# Patient Record
Sex: FEMALE | Race: WHITE | Employment: FULL TIME | URBAN - METROPOLITAN AREA
[De-identification: names, ages, dates, MRNs, and addresses within clinical notes are randomized per-mention and may not be internally consistent; named-entity substitution may affect disease eponyms.]

---

## 2017-01-05 ENCOUNTER — OP HISTORICAL/CONVERTED ENCOUNTER (OUTPATIENT)
Dept: OTHER | Age: 62
End: 2017-01-05

## 2017-01-10 ENCOUNTER — IP HISTORICAL/CONVERTED ENCOUNTER (OUTPATIENT)
Dept: OTHER | Age: 62
End: 2017-01-10

## 2017-07-21 ENCOUNTER — OP HISTORICAL/CONVERTED ENCOUNTER (OUTPATIENT)
Dept: OTHER | Age: 62
End: 2017-07-21

## 2017-11-16 ENCOUNTER — OP HISTORICAL/CONVERTED ENCOUNTER (OUTPATIENT)
Dept: OTHER | Age: 62
End: 2017-11-16

## 2017-12-19 ENCOUNTER — OP HISTORICAL/CONVERTED ENCOUNTER (OUTPATIENT)
Dept: OTHER | Age: 62
End: 2017-12-19

## 2018-01-02 ENCOUNTER — IP HISTORICAL/CONVERTED ENCOUNTER (OUTPATIENT)
Dept: OTHER | Age: 63
End: 2018-01-02

## 2018-01-09 ENCOUNTER — OP HISTORICAL/CONVERTED ENCOUNTER (OUTPATIENT)
Dept: OTHER | Age: 63
End: 2018-01-09

## 2018-04-18 ENCOUNTER — OP HISTORICAL/CONVERTED ENCOUNTER (OUTPATIENT)
Dept: OTHER | Age: 63
End: 2018-04-18

## 2018-04-18 ENCOUNTER — ED HISTORICAL/CONVERTED ENCOUNTER (OUTPATIENT)
Dept: OTHER | Age: 63
End: 2018-04-18

## 2019-05-08 ENCOUNTER — ED HISTORICAL/CONVERTED ENCOUNTER (OUTPATIENT)
Dept: OTHER | Age: 64
End: 2019-05-08

## 2020-02-19 ENCOUNTER — OP HISTORICAL/CONVERTED ENCOUNTER (OUTPATIENT)
Dept: OTHER | Age: 65
End: 2020-02-19

## 2020-11-27 ENCOUNTER — TRANSCRIBE ORDER (OUTPATIENT)
Dept: EMERGENCY DEPT | Age: 65
End: 2020-11-27

## 2020-11-27 ENCOUNTER — HOSPITAL ENCOUNTER (OUTPATIENT)
Dept: LAB | Age: 65
Discharge: HOME | End: 2020-11-27
Payer: OTHER GOVERNMENT

## 2020-11-27 DIAGNOSIS — Z01.812 PRE-PROCEDURAL LABORATORY EXAMINATIONS: ICD-10-CM

## 2020-11-27 DIAGNOSIS — Z01.812 PRE-PROCEDURAL LABORATORY EXAMINATIONS: Primary | ICD-10-CM

## 2020-11-27 PROCEDURE — 87635 SARS-COV-2 COVID-19 AMP PRB: CPT

## 2020-11-28 LAB — SARS-COV-2, COV2NT: NOT DETECTED

## 2020-11-30 RX ORDER — LANOLIN ALCOHOL/MO/W.PET/CERES
325 CREAM (GRAM) TOPICAL
COMMUNITY

## 2020-11-30 RX ORDER — BISMUTH SUBSALICYLATE 262 MG
1 TABLET,CHEWABLE ORAL DAILY
COMMUNITY

## 2020-12-01 ENCOUNTER — ANESTHESIA (OUTPATIENT)
Dept: ENDOSCOPY | Age: 65
End: 2020-12-01
Payer: OTHER GOVERNMENT

## 2020-12-01 ENCOUNTER — ANESTHESIA EVENT (OUTPATIENT)
Dept: ENDOSCOPY | Age: 65
End: 2020-12-01
Payer: OTHER GOVERNMENT

## 2020-12-01 ENCOUNTER — HOSPITAL ENCOUNTER (OUTPATIENT)
Age: 65
Setting detail: OUTPATIENT SURGERY
Discharge: HOME | End: 2020-12-01
Attending: SPECIALIST | Admitting: SPECIALIST
Payer: OTHER GOVERNMENT

## 2020-12-01 VITALS
SYSTOLIC BLOOD PRESSURE: 137 MMHG | TEMPERATURE: 97.7 F | RESPIRATION RATE: 15 BRPM | DIASTOLIC BLOOD PRESSURE: 56 MMHG | WEIGHT: 178.35 LBS | HEART RATE: 57 BPM | BODY MASS INDEX: 28.66 KG/M2 | OXYGEN SATURATION: 99 % | HEIGHT: 66 IN

## 2020-12-01 PROCEDURE — 74011250636 HC RX REV CODE- 250/636: Performed by: NURSE ANESTHETIST, CERTIFIED REGISTERED

## 2020-12-01 PROCEDURE — 76040000019: Performed by: SPECIALIST

## 2020-12-01 PROCEDURE — 76060000031 HC ANESTHESIA FIRST 0.5 HR: Performed by: SPECIALIST

## 2020-12-01 PROCEDURE — 74011250637 HC RX REV CODE- 250/637: Performed by: SPECIALIST

## 2020-12-01 PROCEDURE — 2709999900 HC NON-CHARGEABLE SUPPLY: Performed by: SPECIALIST

## 2020-12-01 PROCEDURE — 74011250636 HC RX REV CODE- 250/636: Performed by: SPECIALIST

## 2020-12-01 PROCEDURE — 74011000250 HC RX REV CODE- 250: Performed by: NURSE ANESTHETIST, CERTIFIED REGISTERED

## 2020-12-01 RX ORDER — FENTANYL CITRATE 50 UG/ML
25 INJECTION, SOLUTION INTRAMUSCULAR; INTRAVENOUS AS NEEDED
Status: DISCONTINUED | OUTPATIENT
Start: 2020-12-01 | End: 2020-12-01 | Stop reason: HOSPADM

## 2020-12-01 RX ORDER — LIDOCAINE HYDROCHLORIDE 20 MG/ML
INJECTION, SOLUTION EPIDURAL; INFILTRATION; INTRACAUDAL; PERINEURAL AS NEEDED
Status: DISCONTINUED | OUTPATIENT
Start: 2020-12-01 | End: 2020-12-01 | Stop reason: HOSPADM

## 2020-12-01 RX ORDER — SODIUM CHLORIDE 9 MG/ML
50 INJECTION, SOLUTION INTRAVENOUS CONTINUOUS
Status: DISCONTINUED | OUTPATIENT
Start: 2020-12-01 | End: 2020-12-01 | Stop reason: HOSPADM

## 2020-12-01 RX ORDER — NALOXONE HYDROCHLORIDE 0.4 MG/ML
0.4 INJECTION, SOLUTION INTRAMUSCULAR; INTRAVENOUS; SUBCUTANEOUS
Status: DISCONTINUED | OUTPATIENT
Start: 2020-12-01 | End: 2020-12-01 | Stop reason: HOSPADM

## 2020-12-01 RX ORDER — DEXTROMETHORPHAN/PSEUDOEPHED 2.5-7.5/.8
1.2 DROPS ORAL
Status: DISCONTINUED | OUTPATIENT
Start: 2020-12-01 | End: 2020-12-01 | Stop reason: HOSPADM

## 2020-12-01 RX ORDER — PROPOFOL 10 MG/ML
INJECTION, EMULSION INTRAVENOUS
Status: DISCONTINUED | OUTPATIENT
Start: 2020-12-01 | End: 2020-12-01 | Stop reason: HOSPADM

## 2020-12-01 RX ORDER — MIDAZOLAM HYDROCHLORIDE 1 MG/ML
.25-5 INJECTION, SOLUTION INTRAMUSCULAR; INTRAVENOUS AS NEEDED
Status: DISCONTINUED | OUTPATIENT
Start: 2020-12-01 | End: 2020-12-01 | Stop reason: HOSPADM

## 2020-12-01 RX ORDER — PROPOFOL 10 MG/ML
INJECTION, EMULSION INTRAVENOUS AS NEEDED
Status: DISCONTINUED | OUTPATIENT
Start: 2020-12-01 | End: 2020-12-01 | Stop reason: HOSPADM

## 2020-12-01 RX ORDER — FLUMAZENIL 0.1 MG/ML
0.2 INJECTION INTRAVENOUS
Status: DISCONTINUED | OUTPATIENT
Start: 2020-12-01 | End: 2020-12-01 | Stop reason: HOSPADM

## 2020-12-01 RX ADMIN — SODIUM CHLORIDE 50 ML/HR: 900 INJECTION, SOLUTION INTRAVENOUS at 07:19

## 2020-12-01 RX ADMIN — PROPOFOL 80 MG: 10 INJECTION, EMULSION INTRAVENOUS at 08:10

## 2020-12-01 RX ADMIN — LIDOCAINE HYDROCHLORIDE 20 MG: 20 INJECTION, SOLUTION INTRAVENOUS at 08:15

## 2020-12-01 RX ADMIN — LIDOCAINE HYDROCHLORIDE 40 MG: 20 INJECTION, SOLUTION INTRAVENOUS at 08:10

## 2020-12-01 RX ADMIN — PROPOFOL 140 MCG/KG/MIN: 10 INJECTION, EMULSION INTRAVENOUS at 08:10

## 2020-12-01 RX ADMIN — LIDOCAINE HYDROCHLORIDE 20 MG: 20 INJECTION, SOLUTION INTRAVENOUS at 08:13

## 2020-12-01 NOTE — PROCEDURES
[facility] GURU Mccann MD  [phone]      2020    Esophagogastroduodenoscopy & Colonoscopy Procedure Note  Beryl Webber  : 1955  [address] [place] [address] Medical Record Number: [id number]      Indications:    Epigastric pain. Screening Colonoscopy   Referring Physician:  Emerson Calle MD  Anesthesia/Sedation: Conscious Sedation/Moderate Sedation/MAC  Endoscopist:  Dr. Rodrigue Peraza  Complications:  None  Estimated Blood Loss:  None    Permit:  The indications, risks, benefits and alternatives were reviewed with the patient or their decision maker who was provided an opportunity to ask questions and all questions were answered. The specific risks of esophagogastroduodenoscopy with conscious sedation were reviewed, including but not limited to anesthetic complication, bleeding, adverse drug reaction, missed lesion, infection, IV site reactions, and intestinal perforation which would lead to the need for surgical repair. Alternatives to EGD and colonoscopy including radiographic imaging, observation without testing, or laboratory testing were reviewed as well as the limitations of those alternatives discussed. After considering the options and having all their questions answered, the patient or their decision maker provided both verbal and written consent to proceed. -----------EGD------------   Procedure in Detail:  After obtaining informed consent, positioning of the patient in the left lateral decubitus position, and conduction of a pre-procedure pause or \"time out\" the endoscope was introduced into the mouth and advanced to the duodenum. A careful inspection was made, and findings or interventions are described below. Findings:   Esophagus:normal  Stomach: Status post fernanda-Y gastric bypass with healthy stomach and anastomosis.   No stenosis stricture or ulceration at the anastomosis. Duodenum/jejunum: normal        ----------Colonoscopy-----------    Procedure in Detail:  After obtaining informed consent, positioning of the patient in the left lateral decubitus position, and conduction of a pre-procedure pause or \"time out\" the endoscope was introduced into the anus and advanced to the cecum, which was identified by the ileocecal valve and appendiceal orifice. The quality of the colonic preparation was good. A careful inspection was made as the colonoscope was withdrawn, findings and interventions are described below. Findings:   No polyps masses or cancer. Somewhat difficult procedure required pressure from the assistant but definitive intubation of the cecum and photodocumentation obtained. ------------------------------  Specimens:    none    Complications:   None; patient tolerated the procedure well. Impressions:  EGD:  Normal for her surgical history. Colonoscopy: Normal colonoscopy to the cecum, with no evidence of neoplasia, diverticular disease, or mucosal abnormality. Recommendations:     - For colon cancer screening in this average-risk patient, colonoscopy may be repeated in 10 years. - Will request imaging if her epigastric pain symptoms and regurgitation continue. Thank you for entrusting me with this patient's care. Please do not hesitate to contact me with any questions or if I can be of assistance with any of your other patients' GI needs. Signed By: Maya Og MD                        December 1, 2020    Surgical assistant none. Implants none unless specified.

## 2020-12-01 NOTE — INTERVAL H&P NOTE
Pre-Endoscopy H&P Update  Chief complaint/HPI/ROS:  The indication for the procedure, the patient's history and the patient's current medications are reviewed prior to the procedure and that data is reported on the H&P to which this document is attached. Any significant complaints with regard to organ systems will be noted, and if not mentioned then a review of systems is not contributory. Past Medical History:   Diagnosis Date    Asthma     not treated since     Herpes simplex     Kidney stones     Seasonal allergic rhinitis     Thromboembolus ([address]     right leg/lung      Past Surgical History:   Procedure Laterality Date    BREAST SURGERY PROCEDURE UNLISTED      right breast bx - benign    HX GASTRIC BYPASS  2009    HX GYN       x 1 with tubal ligation    HX ORTHOPAEDIC Left     partial and full knee    HX ORTHOPAEDIC Right     partial knee    HX OTHER SURGICAL      Blade filter right groin    HX OTHER SURGICAL      colon x 1 - benign colon polys x 3/EGD x 4-5 with dilation    HX OTHER SURGICAL  0229,0370    tumor removed left jaw x 2 - benign/with bone removed from hip to reconstruct jaw    HX OTHER SURGICAL      steel plate in jaw removed     Social   Social History     Tobacco Use    Smoking status: Never Smoker    Smokeless tobacco: Never Used   Substance Use Topics    Alcohol use: No      Family History   Problem Relation Age of Onset    Kidney Disease Mother     Cancer Father 71        lymphoma    Cancer Paternal Grandfather         prostate      No Known Allergies   Prior to Admission Medications   Prescriptions Last Dose Informant Patient Reported? Taking? VALACYCLOVIR HCL (VALTREX PO) 2020  Yes No   Sig: Take 1 Tab by mouth daily as needed. cetirizine (ZYRTEC) 10 mg tablet 2020  Yes No   Sig: Take 10 mg by mouth daily. ferrous sulfate (Iron) 325 mg (65 mg iron) tablet 2020  Yes Yes   Sig: Take 325 mg by mouth Daily (before breakfast). fluticasone propionate (FLONASE ALLERGY RELIEF NA) 2020 at Unknown time  Yes Yes   Si Spray by Nasal route as needed. multivitamin (ONE A DAY) tablet 2020  Yes Yes   Sig: Take 1 Tab by mouth daily. Facility-Administered Medications: None       PHYSICAL EXAM:  The patient is examined immediately prior to the procedure. Visit Vitals  /64   Pulse (!) 53   Temp 98.9 °F (37.2 °C)   Resp 13   Ht 5' 6\" (1.676 m)   Wt 80.9 kg (178 lb 5.6 oz)   SpO2 99%   Breastfeeding No   BMI 28.79 kg/m²     Gen: Appears comfortable, no distress. Pulm: comfortable respirations with no abnormal audible breath sounds  HEART: well perfused, no abnormal audible heart sounds  GI: abdomen flat. PLAN:  Informed consent discussion held, patient afforded an opportunity to ask questions and all questions answered. After being advised of the risks, benefits, and alternatives, the patient requested that we proceed and indicated so on a written consent form. Will proceed with procedure as planned.   Saige Casas MD

## 2020-12-01 NOTE — PERIOP NOTES
Received report from Sonia Holder CRNA. See anesthesia record. Patient taken to post-recovery. Post-recovery report given to Luzmaria Cortez RN. Patient's ABD remains soft and non-tender post procedure. Pt has no complaints at this time and tolerated the procedure well. Endoscope was pre-cleaned at bedside immediately following procedure by Ever Mukherjee

## 2020-12-01 NOTE — ANESTHESIA PREPROCEDURE EVALUATION
Relevant Problems   No relevant active problems       Anesthetic History   No history of anesthetic complications            Review of Systems / Medical History  Patient summary reviewed and pertinent labs reviewed    Pulmonary  Within defined limits                 Neuro/Psych   Within defined limits           Cardiovascular                  Exercise tolerance: >4 METS     GI/Hepatic/Renal         Renal disease: stones       Endo/Other        Arthritis and anemia     Other Findings   Comments: DVT/PE-el filter in place    Benign tumor surgically removed from jaw           Physical Exam    Airway  Mallampati: I  TM Distance: 4 - 6 cm  Neck ROM: normal range of motion   Mouth opening: Normal     Cardiovascular    Rhythm: regular  Rate: normal         Dental    Dentition: Upper dentition intact and Lower dentition intact     Pulmonary  Breath sounds clear to auscultation               Abdominal         Other Findings            Anesthetic Plan    ASA: 2  Anesthesia type: MAC          Induction: Intravenous  Anesthetic plan and risks discussed with: Patient

## 2020-12-01 NOTE — ROUTINE PROCESS
Beryl Big  1955  [id number]    Situation:  Verbal report received from: Sheridan Alvares  Procedure: Procedure(s):  COLONOSCOPY/EGD  ESOPHAGOGASTRODUODENOSCOPY (EGD)    Background:    Preoperative diagnosis: EPIGASTRIC PAIN/HX COLON POLYPS  Postoperative diagnosis: 1-History of Gastric Bypass  2- Normal Colonoscopy. :  Dr. Doug Mccann  Assistant(s): Endoscopy Technician-1: Rios Daley  Endoscopy RN-1: Edna Go    Specimens: * No specimens in log *  H. Pylori  no    Assessment:  Intra-procedure medications     Anesthesia gave intra-procedure sedation and medications, see anesthesia flow sheet yes    Intravenous fluids: NS@ KVO     Vital signs stable     Abdominal assessment: round and soft     Recommendation:  Discharge patient per MD order. Family-  Permission to share finding with family or friend yes    Endoscopy discharge instructions have been reviewed and given to patient. The patient verbalized understanding and acceptance of instructions. Dr. Doug Mccann discussed with spouse procedure findings and next steps.

## 2020-12-01 NOTE — ANESTHESIA POSTPROCEDURE EVALUATION
Procedure(s):  COLONOSCOPY/EGD  ESOPHAGOGASTRODUODENOSCOPY (EGD). MAC    Anesthesia Post Evaluation      Multimodal analgesia: multimodal analgesia used between 6 hours prior to anesthesia start to PACU discharge  Patient location during evaluation: bedside  Patient participation: complete - patient participated  Level of consciousness: awake  Pain management: adequate  Airway patency: patent  Anesthetic complications: no  Cardiovascular status: acceptable  Respiratory status: acceptable  Hydration status: acceptable        INITIAL Post-op Vital signs:   Vitals Value Taken Time   /56 12/1/2020  8:52 AM   Temp 36.5 °C (97.7 °F) 12/1/2020  8:40 AM   Pulse 60 12/1/2020  8:54 AM   Resp 15 12/1/2020  8:54 AM   SpO2 98 % 12/1/2020  8:55 AM   Vitals shown include unvalidated device data.

## 2020-12-01 NOTE — DISCHARGE INSTRUCTIONS
[address] [facility] GURU Lancaster MD  [phone]      December 1, 2020    Alexis Gaffney  YOB: 1955    COLONOSCOPY DISCHARGE INSTRUCTIONS    If there is redness at IV site you should apply warm compress to area. If redness or soreness persist contact Dr. Fatoumata Lancaster' or your primary care doctor. There may be a slight amount of blood passed from the rectum. Gaseous discomfort may develop, but walking, belching will help relieve this. You may not operate a vehicle for 12 hours  You may not operate machinery or dangerous appliances for rest of today  You may not drink alcoholic beverages for 12 hours  Avoid making any critical decisions for 24 hours    DIET:  You may resume your normal diet, but some patients find that heavy or large meals may lead to indigestion or vomiting. I suggest a light meal as first food intake. MEDICATIONS:  The use of some over-the-counter pain medication may lead to bleeding after colon biopsies or polyp removal.  Tylenol (also called acetaminophen) is safe to take even if you have had colonoscopy with polyp removal.  Based on the procedure you had today you may safely take aspirin or aspirin-like products for the next ten (10) days. Remember that Tylenol (also called acetaminophen) is safe to take after colonoscopy even if you have had biopsies or polyps removed. ACTIVITY:  You may resume your normal household activities, but it is recommended that you spend the remainder of the day resting -  avoid any strenuous activity. CALL DR. Shahrzad Haney' OFFICE IF:  Increasing pain, nausea, vomiting  Abdominal distension (swelling)  Significant new or increased bleeding (oral or rectal)  Fever/Chills  Chest pain/shortness of breath                       Additional instructions: The colon is normal - no polyps, no colon cancer. We were able to examine the entire colon today.   The upper GI tract reveals that you've had previous gastric bypass surgery but no other findings - no inflammation, no ulcer, no restriction or narrowing. I've left word with my office that if you have ongoing symptoms of upper abdominal pain or vomiting then the next step would be you to call the office and we'll arrange CT scan. For your alteration in bowel habits I suggest you try a probiotic called ALIGN, available over the counter without prescription - take one capsule daily for 8 weeks and I'll arrange a follow up in the office in 8 weeks (it may take that long for the probiotic to reach its maximum effect). It was an honor to be your doctor today. Please let me or my office staff know if you have any feedback about today's procedure. Maya Og MD    Colonoscopy saves lives, and can prevent colon cancer. Everyone aged 48 or older needs colonoscopy.   Tell your family and friends: get the test!

## 2020-12-01 NOTE — H&P
Date: 10/2/2020 1:00 PM   Patient Name: Erika Kimball   Account #: [id number]    Gender: Female    (age): 1955 (60)       Provider:     Argelia Raygoza PA-C        Referring Physician:     [address], [address]  [phone] (phone)  [phone] (fax)        Chief Complaint: epigastric pain, due for colonoscopy. History of Present Illness:   59 y/o female who presents to the office today for evaluation for epigastric pain/chest pain. She reports she had a h/o gastric bypass in . She reports the pain was constant, radiated to her back. It went away for a couple of days, but then returned. She describes it as an ache. She reports that when it started, everything she ate came back up. It feels like it goes down, then comes back up. She is not vomiting it back up within 10 minutes (more regurgitation) She reports that now, most of the time things stay down, but it still is intermittently problematic. She denies worsening any time of day, but tells me the pain is worst post-prandially. No hematemesis/coffee-ground emesis. She denies any weight loss and actually endorses weight gain. The pain was constant, not with exertion, no shortness of breath/dyspnea on exertion, no palpitations, no diaphoresis, no jaw pain or arm pain. Her last colonoscopy was in  where they removed polyps (unknown # of polyps or histology), and prior to that she had polyps. (first colonoscopy was in , recall 3 years, second  with recall 10 years.)    She alternates between constipation/diarrhea, but she did see some red color in her stool once a few weeks ago. No family history of colon cancer. No GERD history. No history of hiatal hernia. No NSAIDs at all, but took a few aspirin. No shortness of breath, no dyspnea on exertion. She is very fatigued all the time. She also has lower extremity weakness. ? 59 y/o female who presents to the office today for evaluation for epigastric pain/chest pain. She reports she had a h/o gastric bypass in 2009. She reports the pain was constant, radiated to her back. It went away for a couple of days, but then returned. She describes it as an ache. She reports that when it started, everything she ate came back up. It feels like it goes down, then comes back up. She is not vomiting it back up within 10 minutes (more regurgitation) She reports that now, most of the time things stay down, but it still is intermittently problematic. She denies worsening any time of day, but tells me the pain is worst post-prandially. No hematemesis/coffee-ground emesis. She denies any weight loss and actually endorses weight gain. The pain was constant, not with exertion, no shortness of breath/dyspnea on exertion, no palpitations, no diaphoresis, no jaw pain or arm pain. Her last colonoscopy was in 2009 where they removed polyps (unknown # of polyps or histology), and prior to that she had polyps. (first colonoscopy was in 2006, recall 3 years, second 2009 with recall 10 years.)    She alternates between constipation/diarrhea, but she did see some red color in her stool once a few weeks ago. No family history of colon cancer. No GERD history. No history of hiatal hernia. No NSAIDs at all, but took a few aspirin. No shortness of breath, no dyspnea on exertion. ? She is very fatigued all the time. She also has lower extremity weakness.       Past Medical History      Medical Conditions: Anemia  Arthritis  Asthma  Colon polyps  Hemorrhoids  MRSA   Surgical Procedures: Gastric By-Pass   Dx Studies: Abdominal U/S  Colonoscopy  CT Scan   Medications: cetirizine 10 mg  ergocalciferol (vitamin D2) 1,250 mcg (50,000 unit)  ferrous sulfate 325 mg (65 mg iron)  fluticasone propionate 50 mcg/actuation  melatonin 3 mg  valacyclovir 500 mg   Allergies: Patient has no known allergies or drug allergies   Immunizations: Influenza, seasonal, injectable, 10/01/2019  Influenza, seasonal, injectable      Social History      Alcohol: None   Tobacco: Never smoker   Drugs: None   Exercise: Exercise less than 3 times a week. Caffeine: Daily. Marital Status:          Occupation:               Family History       Review of Systems:   Cardiovascular: Presents suffers from chest pain, peripheral edema. Denies irregular heart beat, palpitations, syncope, Sweats. Constitutional: Presents suffers from fatigue, weight gain. Denies fever, loss of appetite, weight loss. ENMT: Denies nose bleeds, sore throat, hearing loss. Endocrine: Denies excessive thirst, heat intolerance. Eyes: Denies loss of vision. Gastrointestinal: Presents suffers from abdominal pain, change in bowel habits, constipation, diarrhea, Bloating/gas, nausea, vomiting. Denies abdominal swelling, heartburn, jaundice, rectal bleeding, stomach cramps, dysphagia, rectal pain, Stool incontinence, hematemesis. Genitourinary: Denies dark urine, dysuria, frequent urination, hematuria, incontinence. Hematologic/Lymphatic: Denies easy bruising, prolonged bleeding. Integumentary: Denies itching, rashes, sun sensitivity. Musculoskeletal: Presents suffers from arthritis, back pain, joint pain, muscle weakness. Denies gout, stiffness. Neurological: Presents suffers from dizziness. Denies fainting, frequent headaches, memory loss. Psychiatric: Denies anxiety, depression, difficulty sleeping, hallucinations, nervousness, panic attacks, paranoia. Respiratory: Presents suffers from dyspnea. Denies cough, wheezing. Vital Signs:   BP  (mmHg)  Pulse  (ppm) Rhythm Weight (lbs/oz) Height (ft/in) BMI Temp   132/64 57 Regular 180 / 8 5 / 6 29.13 98.4 (F)      Physical Exam:   Constitutional:      Appearance: No distress, appears comfortable. Communication: Understands/receives spoken information. Skin:      Inspection: No rash, no jaundice.    Palpation: No subcutaneous nodules. Head/face: Inspection: Normacephalic, atraumatic. Eyes:      Conjunctivae/lids: Normal.   Pupils/irises: Pupils equal, round and normal.   ENMT:      External: Normal.   Hearing: Normal.   Respiratory:      Effort: Normal respiratory effort, comfortable, speaks in complete sentences. Auscultation: normal breath sounds, no rubs, wheezes or rhonchi. Cardiovascular: Auscultation: normal, S1 and S2, no gallops , no rubs or murmurs . Peripheral: no edema. Gastrointestinal/Abdomen:      Abdomen: non-distended, nontender. Liver/Spleen: normal, normal size, Liver size and consistency normal, spleen is non-palpable. Musculoskeletal:      Gait/station: normal.   Muscles: normal strength and tone, no atrophy or abnormal movements. Extremities:      RLE: Normal.   LLE: Normal.   Psychiatric:      Judgment/insight: Normal, normal judgement, normal insight. Orientation: oriented to time, space and person. Lab Results: No Electronic Results      Impressions: Epigastric pain  Personal history of colonic polyps? ?Epigastric pain? ?  ? ? Personal history of colonic polyps? Assessment: 59 y/o female presents to the office for epigastric/lower chest pain immediately superior to the xiphoid process. PO intake makes it feel worse, and it is associated with regurgitation. No NSAIDs aside from aspirin intermittently. Has h/o Gastric bypass in 2009, and personal history of polyps on colonoscopy in 2006 and 2009, and states she was due for a colonoscopy in 2019. Will get her scheduled for EGD/Colonoscopy, but given chest pain will ask her to get an EKG with her PCP prior. I will reach out to Dr. Dana Harding, as she wants to have this done at [facility], and see if a normal EKG is sufficient or if she should see a cardiologist pre-procedure. ? 59 y/o female presents to the office for epigastric/lower chest pain immediately superior to the xiphoid process.  PO intake makes it feel worse, and it is associated with regurgitation. No NSAIDs aside from aspirin intermittently. Has h/o Gastric bypass in 2009, and personal history of polyps on colonoscopy in 2006 and 2009, and states she was due for a colonoscopy in 2019. Will get her scheduled for EGD/Colonoscopy, but given chest pain will ask her to get an EKG with her PCP prior. I will reach out to Dr. Mikal Bowden, as she wants to have this done at [address] [place] [address], and see if a normal EKG is sufficient or if she should see a cardiologist pre-procedure. Plan: EGD-Colonoscopy- [address] [place] [address] (Dr. Mikal Bowden)  Upper Endoscopy (EGD): options, risks, benefits and complications of bleeding, tear, surgical repair (1:1000) and reaction to medication, aspiration, and pneumonia explained to patient. 5% chance of missing lesions explained. All questions answered. Colonoscopy/Biopsy/Polypectomy: options, risks, benefits and complications of bleeding, tear, surgical repair (1:1000) & 1:100 post Polypectomy, and reaction of medication, aspiration, Pneumonia explained to patient, 5% chance of missing colon cancer and other lesions explained. All questions answered. Suprep Bowel Prep Kit 17.5-3.13-1.6 gram Take as directed     Mrs. Donavan Kc: Please have your PCP order an EKG so that we can make sure this looks normal prior to us scheduling your EGD/Colonoscopy. ? ?EGD-Colonoscopy- [address] [place] [address] (Dr. Mikal Bowden)? ?  ? ? Upper Endoscopy (EGD): options, risks, benefits and complications of bleeding, tear, surgical repair (1:1000) and reaction to medication, aspiration, and pneumonia explained to patient. 5% chance of missing lesions explained. All questions answered. ? ?  ? ? Colonoscopy/Biopsy/Polypectomy: options, risks, benefits and complications of bleeding, tear, surgical repair (1:1000) & 1:100 post Polypectomy, and reaction of medication, aspiration, Pneumonia explained to patient, 5% chance of missing colon cancer and other lesions explained. All questions answered. ? ?  ? ? Suprep Bowel Prep Kit? ?17.5-3.13-1.6 gram? ?Take as directed? ?   ?  ? ?Mrs. Loral Dance: Please have your PCP order an EKG so that we can make sure this looks normal prior to us scheduling your EGD/Colonoscopy. ? Risk & Medical Necessity: The patient requires Moderate Severity care for this visit. Diagnosis and management options are Extensive. The amount of data reviewed and/or ordered is Limited. The level of risk is Moderate. Notes: Dr. Lauren Ramirez was available for consultation during this office visit.              Marilynn Cummings PA-C     Electronically signed on 10/2/2020 1:24:05 PM by JANENE Avila [id number], MRN [id number],  1955 [facility] First Visit, 2020                                                                                                                                                        New     Modify          Delete     Delete all     Edit Wording          Sign     [email or web address]

## 2020-12-08 ENCOUNTER — TRANSCRIBE ORDER (OUTPATIENT)
Dept: SCHEDULING | Age: 65
End: 2020-12-08

## 2020-12-08 DIAGNOSIS — R55 SYNCOPE AND COLLAPSE: Primary | ICD-10-CM

## 2020-12-21 ENCOUNTER — TRANSCRIBE ORDER (OUTPATIENT)
Dept: SCHEDULING | Age: 65
End: 2020-12-21

## 2020-12-21 DIAGNOSIS — R55 SYNCOPE AND COLLAPSE: Primary | ICD-10-CM

## 2021-01-11 ENCOUNTER — TRANSCRIBE ORDER (OUTPATIENT)
Dept: REGISTRATION | Age: 66
End: 2021-01-11

## 2021-01-11 ENCOUNTER — HOSPITAL ENCOUNTER (OUTPATIENT)
Dept: LAB | Age: 66
Discharge: HOME | End: 2021-01-11
Attending: PSYCHIATRY & NEUROLOGY
Payer: OTHER GOVERNMENT

## 2021-01-11 ENCOUNTER — HOSPITAL ENCOUNTER (OUTPATIENT)
Dept: NON INVASIVE DIAGNOSTICS | Age: 66
Discharge: HOME | End: 2021-01-11
Attending: PSYCHIATRY & NEUROLOGY
Payer: OTHER GOVERNMENT

## 2021-01-11 ENCOUNTER — HOSPITAL ENCOUNTER (OUTPATIENT)
Dept: MRI IMAGING | Age: 66
Discharge: HOME | End: 2021-01-11
Attending: PSYCHIATRY & NEUROLOGY
Payer: OTHER GOVERNMENT

## 2021-01-11 VITALS — BODY MASS INDEX: 29.86 KG/M2 | WEIGHT: 185 LBS

## 2021-01-11 DIAGNOSIS — R55 SYNCOPE AND COLLAPSE: ICD-10-CM

## 2021-01-11 DIAGNOSIS — R55 SYNCOPE AND COLLAPSE: Primary | ICD-10-CM

## 2021-01-11 LAB
CREAT SERPL-MCNC: 0.56 MG/DL (ref 0.55–1.02)
LEFT CCA DIST DIAS: 24.4 CM/S
LEFT CCA DIST SYS: 97 CM/S
LEFT CCA PROX DIAS: 29.8 CM/S
LEFT CCA PROX SYS: 94.7 CM/S
LEFT ECA DIAS: 28.2 CM/S
LEFT ECA SYS: 178 CM/S
LEFT ICA DIST DIAS: -43 CM/S
LEFT ICA DIST SYS: -138 CM/S
LEFT ICA PROX DIAS: -28.9 CM/S
LEFT ICA PROX SYS: -116 CM/S
LEFT SUBCLAVIAN DIAS: 0 CM/S
LEFT SUBCLAVIAN SYS: -93.9 CM/S
LEFT VERTEBRAL DIAS: -14.3 CM/S
LEFT VERTEBRAL SYS: -54.9 CM/S
RIGHT CCA DIST DIAS: 22 CM/S
RIGHT CCA DIST SYS: 76.3 CM/S
RIGHT CCA PROX DIAS: 22.2 CM/S
RIGHT CCA PROX SYS: 109 CM/S
RIGHT ECA DIAS: -14 CM/S
RIGHT ECA SYS: -90.8 CM/S
RIGHT ICA DIST DIAS: -57.3 CM/S
RIGHT ICA DIST SYS: -151 CM/S
RIGHT ICA PROX DIAS: 13.4 CM/S
RIGHT ICA PROX SYS: 65.9 CM/S
RIGHT SUBCLAVIAN DIAS: 0 CM/S
RIGHT SUBCLAVIAN SYS: -79.6 CM/S
RIGHT VERTEBRAL DIAS: -22.9 CM/S
RIGHT VERTEBRAL SYS: -68.8 CM/S

## 2021-01-11 PROCEDURE — A9577 INJ MULTIHANCE: HCPCS | Performed by: PSYCHIATRY & NEUROLOGY

## 2021-01-11 PROCEDURE — 82565 ASSAY OF CREATININE: CPT

## 2021-01-11 PROCEDURE — 93880 EXTRACRANIAL BILAT STUDY: CPT

## 2021-01-11 PROCEDURE — 36415 COLL VENOUS BLD VENIPUNCTURE: CPT

## 2021-01-11 PROCEDURE — 70553 MRI BRAIN STEM W/O & W/DYE: CPT

## 2021-01-11 PROCEDURE — 74011250636 HC RX REV CODE- 250/636: Performed by: PSYCHIATRY & NEUROLOGY

## 2021-01-11 RX ADMIN — GADOBENATE DIMEGLUMINE 20 ML: 529 INJECTION, SOLUTION INTRAVENOUS at 11:44

## 2021-01-21 ENCOUNTER — TRANSCRIBE ORDER (OUTPATIENT)
Dept: SCHEDULING | Age: 66
End: 2021-01-21

## 2021-01-21 DIAGNOSIS — I65.29 OCCLUSION AND STENOSIS OF UNSPECIFIED CAROTID ARTERY: Primary | ICD-10-CM

## 2021-01-29 ENCOUNTER — HOSPITAL ENCOUNTER (OUTPATIENT)
Dept: CT IMAGING | Age: 66
Discharge: HOME | End: 2021-01-29
Attending: PSYCHIATRY & NEUROLOGY
Payer: OTHER GOVERNMENT

## 2021-01-29 DIAGNOSIS — I65.29 OCCLUSION AND STENOSIS OF UNSPECIFIED CAROTID ARTERY: ICD-10-CM

## 2021-01-29 PROCEDURE — 74011000636 HC RX REV CODE- 636: Performed by: PSYCHIATRY & NEUROLOGY

## 2021-01-29 PROCEDURE — 70498 CT ANGIOGRAPHY NECK: CPT

## 2021-01-29 RX ADMIN — IOPAMIDOL 100 ML: 755 INJECTION, SOLUTION INTRAVENOUS at 12:02

## 2022-08-19 ENCOUNTER — HOSPITAL ENCOUNTER (OUTPATIENT)
Dept: GENERAL RADIOLOGY | Age: 67
Discharge: HOME | End: 2022-08-19
Payer: MEDICARE

## 2022-08-19 ENCOUNTER — TRANSCRIBE ORDER (OUTPATIENT)
Dept: GENERAL RADIOLOGY | Age: 67
End: 2022-08-19

## 2022-08-19 DIAGNOSIS — R06.02 SHORTNESS OF BREATH: ICD-10-CM

## 2022-08-19 DIAGNOSIS — R06.02 SHORTNESS OF BREATH: Primary | ICD-10-CM

## 2022-08-19 PROCEDURE — 71046 X-RAY EXAM CHEST 2 VIEWS: CPT

## 2023-02-21 ENCOUNTER — TRANSCRIBE ORDER (OUTPATIENT)
Dept: MRI IMAGING | Age: 68
End: 2023-02-21

## 2023-02-21 ENCOUNTER — HOSPITAL ENCOUNTER (OUTPATIENT)
Dept: GENERAL RADIOLOGY | Age: 68
Discharge: HOME | End: 2023-02-21
Payer: MEDICARE

## 2023-02-21 DIAGNOSIS — M54.2 CERVICALGIA: ICD-10-CM

## 2023-02-21 DIAGNOSIS — M54.2 CERVICALGIA: Primary | ICD-10-CM

## 2023-02-21 PROCEDURE — 72050 X-RAY EXAM NECK SPINE 4/5VWS: CPT

## 2023-10-24 ENCOUNTER — HOSPITAL ENCOUNTER (OUTPATIENT)
Facility: HOSPITAL | Age: 68
Discharge: HOME | End: 2023-10-27
Attending: INTERNAL MEDICINE
Payer: MEDICARE

## 2023-10-24 DIAGNOSIS — M54.2 CERVICALGIA: ICD-10-CM

## 2023-10-24 DIAGNOSIS — M54.81 OCCIPITAL NEURALGIA, UNSPECIFIED LATERALITY: ICD-10-CM

## 2023-10-24 DIAGNOSIS — M54.12 CERVICAL RADICULOPATHY: ICD-10-CM

## 2023-10-24 PROCEDURE — 72141 MRI NECK SPINE W/O DYE: CPT

## 2024-08-07 ENCOUNTER — ANESTHESIA EVENT (OUTPATIENT)
Facility: HOSPITAL | Age: 69
End: 2024-08-07
Payer: MEDICARE

## 2024-08-07 ENCOUNTER — HOSPITAL ENCOUNTER (OUTPATIENT)
Facility: HOSPITAL | Age: 69
Setting detail: OUTPATIENT SURGERY
Discharge: HOME | End: 2024-08-07
Attending: INTERNAL MEDICINE | Admitting: INTERNAL MEDICINE
Payer: MEDICARE

## 2024-08-07 ENCOUNTER — ANESTHESIA (OUTPATIENT)
Facility: HOSPITAL | Age: 69
End: 2024-08-07
Payer: MEDICARE

## 2024-08-07 VITALS
OXYGEN SATURATION: 99 % | HEIGHT: 66 IN | DIASTOLIC BLOOD PRESSURE: 69 MMHG | TEMPERATURE: 98 F | RESPIRATION RATE: 18 BRPM | HEART RATE: 61 BPM | SYSTOLIC BLOOD PRESSURE: 126 MMHG | WEIGHT: 180 LBS | BODY MASS INDEX: 28.93 KG/M2

## 2024-08-07 PROCEDURE — 3700000001 HC ADD 15 MINUTES (ANESTHESIA): Performed by: INTERNAL MEDICINE

## 2024-08-07 PROCEDURE — 2709999900 HC NON-CHARGEABLE SUPPLY: Performed by: INTERNAL MEDICINE

## 2024-08-07 PROCEDURE — 2500000003 HC RX 250 WO HCPCS: Performed by: NURSE PRACTITIONER

## 2024-08-07 PROCEDURE — 7100000010 HC PHASE II RECOVERY - FIRST 15 MIN: Performed by: INTERNAL MEDICINE

## 2024-08-07 PROCEDURE — 3600007501: Performed by: INTERNAL MEDICINE

## 2024-08-07 PROCEDURE — 3700000000 HC ANESTHESIA ATTENDED CARE: Performed by: INTERNAL MEDICINE

## 2024-08-07 PROCEDURE — 3600007511: Performed by: INTERNAL MEDICINE

## 2024-08-07 PROCEDURE — 7100000011 HC PHASE II RECOVERY - ADDTL 15 MIN: Performed by: INTERNAL MEDICINE

## 2024-08-07 PROCEDURE — 2580000003 HC RX 258: Performed by: INTERNAL MEDICINE

## 2024-08-07 PROCEDURE — 6360000002 HC RX W HCPCS: Performed by: NURSE PRACTITIONER

## 2024-08-07 RX ORDER — LIDOCAINE HYDROCHLORIDE 20 MG/ML
INJECTION, SOLUTION EPIDURAL; INFILTRATION; INTRACAUDAL; PERINEURAL PRN
Status: DISCONTINUED | OUTPATIENT
Start: 2024-08-07 | End: 2024-08-07 | Stop reason: SDUPTHER

## 2024-08-07 RX ORDER — PROPOFOL 10 MG/ML
INJECTION, EMULSION INTRAVENOUS PRN
Status: DISCONTINUED | OUTPATIENT
Start: 2024-08-07 | End: 2024-08-07 | Stop reason: SDUPTHER

## 2024-08-07 RX ORDER — SODIUM CHLORIDE, SODIUM LACTATE, POTASSIUM CHLORIDE, CALCIUM CHLORIDE 600; 310; 30; 20 MG/100ML; MG/100ML; MG/100ML; MG/100ML
INJECTION, SOLUTION INTRAVENOUS CONTINUOUS
Status: DISCONTINUED | OUTPATIENT
Start: 2024-08-07 | End: 2024-08-07 | Stop reason: HOSPADM

## 2024-08-07 RX ORDER — GLYCOPYRROLATE 0.2 MG/ML
INJECTION INTRAMUSCULAR; INTRAVENOUS PRN
Status: DISCONTINUED | OUTPATIENT
Start: 2024-08-07 | End: 2024-08-07 | Stop reason: SDUPTHER

## 2024-08-07 RX ADMIN — PROPOFOL 20 MG: 10 INJECTION, EMULSION INTRAVENOUS at 08:53

## 2024-08-07 RX ADMIN — SODIUM CHLORIDE, POTASSIUM CHLORIDE, SODIUM LACTATE AND CALCIUM CHLORIDE: 600; 310; 30; 20 INJECTION, SOLUTION INTRAVENOUS at 08:16

## 2024-08-07 RX ADMIN — GLYCOPYRROLATE 0.2 MG: 0.2 INJECTION, SOLUTION INTRAMUSCULAR; INTRAVENOUS at 08:50

## 2024-08-07 RX ADMIN — PROPOFOL 80 MG: 10 INJECTION, EMULSION INTRAVENOUS at 08:51

## 2024-08-07 RX ADMIN — LIDOCAINE HYDROCHLORIDE 100 MG: 20 INJECTION, SOLUTION EPIDURAL; INFILTRATION; INTRACAUDAL; PERINEURAL at 08:50

## 2024-08-07 ASSESSMENT — PAIN - FUNCTIONAL ASSESSMENT
PAIN_FUNCTIONAL_ASSESSMENT: 0-10
PAIN_FUNCTIONAL_ASSESSMENT: 0-10

## 2024-08-07 ASSESSMENT — PAIN SCALES - GENERAL: PAINLEVEL_OUTOF10: 0

## 2024-08-07 NOTE — ANESTHESIA PRE PROCEDURE
Department of Anesthesiology  Preprocedure Note       Name:  Zhanna Dacosta   Age:  68 y.o.  :  1955                                          MRN:  [id number]         Date:  2024      Surgeon: Surgeon(s):  Nilda Chavez MD    Procedure: Procedure(s):  ESOPHAGOGASTRODUODENOSCOPY    Medications prior to admission:   Prior to Admission medications    Medication Sig Start Date End Date Taking? Authorizing Provider   cetirizine (ZYRTEC) 10 MG tablet Take 1 tablet by mouth daily    Automatic Reconciliation, Ar   ferrous sulfate (IRON 325) 325 (65 Fe) MG tablet Take 1 tablet by mouth every morning (before breakfast)    Automatic Reconciliation, Ar       Current medications:    Current Facility-Administered Medications   Medication Dose Route Frequency Provider Last Rate Last Admin    lactated ringers IV soln infusion   IntraVENous Continuous Nilda Chavez MD 50 mL/hr at 24 0816 New Bag at 24 0816       Allergies:  No Known Allergies    Problem List:  There is no problem list on file for this patient.      Past Medical History:        Diagnosis Date    Asthma     not treated since     Herpes simplex     Kidney stones     Seasonal allergic rhinitis     Thromboembolus (HCC)     right leg/lung       Past Surgical History:        Procedure Laterality Date    BREAST SURGERY      right breast bx - benign    COLONOSCOPY N/A 2020    COLONOSCOPY/EGD performed by Neil Green MD at [facility] ENDOSCOPY    GASTRIC BYPASS SURGERY      GYN       x 1 with tubal ligation    ORTHOPEDIC SURGERY Left     partial and full knee    ORTHOPEDIC SURGERY Right     partial knee    OTHER SURGICAL HISTORY      steel plate in jaw removed    OTHER SURGICAL HISTORY      tumor removed left jaw x 2 - benign/with bone removed from hip to reconstruct jaw    OTHER SURGICAL HISTORY      colon x 1 - benign colon polys x 3/EGD x 4-5 with dilation    OTHER SURGICAL HISTORY      [place] filter right

## 2024-08-07 NOTE — PROGRESS NOTES
Patient states okay to review and give discharge instructions to [facility]  , pt states she had a respiratory infection 2 weeks ago

## 2024-08-07 NOTE — DISCHARGE INSTRUCTIONS
Recommendation:         -  Resume previous diet.         -  The patient will be observed post-procedure, until all discharge criteria            are met.         -  Return to endoscopist in 4 weeks.         -  No aspirin, ibuprofen, naproxen, or other non-steroidal anti-inflammatory            drugs.         - PPI therapy

## 2024-08-07 NOTE — ANESTHESIA POSTPROCEDURE EVALUATION
Department of Anesthesiology  Postprocedure Note    Patient: Zhanna Dacosta  MRN: [id number]  YOB: 1955  Date of evaluation: 8/7/2024    Procedure Summary       Date: 08/07/24 Room / Location: [facility] 04 / [facility] ENDOSCOPY    Anesthesia Start: 0849 Anesthesia Stop: 0858    Procedure: ESOPHAGOGASTRODUODENOSCOPY (Upper GI Region) Diagnosis: Dysphagia, unspecified type    Surgeons: Nilda Chavez MD Responsible Provider: Anthony Romero Jr., MD    Anesthesia Type: MAC ASA Status: 2            Anesthesia Type: MAC    Jose F Phase I: Jose F Score: 10    Jose F Phase II:      Anesthesia Post Evaluation    Patient location during evaluation: bedside (Endoscopy Unit)  Patient participation: complete - patient participated  Level of consciousness: sleepy but conscious  Pain score: 0  Airway patency: patent  Nausea & Vomiting: no nausea and no vomiting  Cardiovascular status: hemodynamically stable  Respiratory status: acceptable  Hydration status: stable  Comments: This patient remained on the stretcher.  The patient was handed off to the endoscopy nursing team.  All questions regarding pre-, intra-, and postoperative care were answered.  Multimodal analgesia pain management approach    No notable events documented.

## (undated) DEVICE — CANNULA NSL O2 AD 7 FT END-TIDAL CARBON DIOX VENTFLO

## (undated) DEVICE — KIT ENDOSCOPIC  PROC VIA

## (undated) DEVICE — SOLIDIFIER MEDC 1200ML -- CONVERT TO 356117

## (undated) DEVICE — BITEBLOCK ENDOSCP 60FR MAXI WHT POLYETH STURDY W/ VELC WVN

## (undated) DEVICE — ELECTRODE,RADIOTRANSLUCENT,FOAM,3PK: Brand: MEDLINE

## (undated) DEVICE — Device

## (undated) DEVICE — CANNULA CUSH AD W/ 14FT TBG

## (undated) DEVICE — SET GRAV CK VLV NEEDLESS ST 3 GANGED 4WAY STPCOCK HI FLO 10

## (undated) DEVICE — (D)SENSOR RMFG 02 PULS OXMTR -- DISC BY MFR USE ITEM 133445

## (undated) DEVICE — BAG BELONG PT PERS CLEAR HANDL

## (undated) DEVICE — 3M™ CUROS™ DISINFECTING CAP FOR NEEDLELESS CONNECTORS 270/CARTON 20 CARTONS/CASE CFF1-270: Brand: CUROS™

## (undated) DEVICE — CUFF RMFG BP INF SZ 11 DISP -- LAWSON OEM ITEM 238915

## (undated) DEVICE — 1200 GUARD II KIT W/5MM TUBE W/O VAC TUBE: Brand: GUARDIAN

## (undated) DEVICE — KIT COLON W/ 1.1OZ LUB AND 2 END

## (undated) DEVICE — FORCEPS BX 240CM 2.4MM L NDL RAD JAW 4 M00513334

## (undated) DEVICE — SYR 3ML LL TIP 1/10ML GRAD --